# Patient Record
Sex: MALE | Race: BLACK OR AFRICAN AMERICAN | NOT HISPANIC OR LATINO | Employment: UNEMPLOYED | URBAN - METROPOLITAN AREA
[De-identification: names, ages, dates, MRNs, and addresses within clinical notes are randomized per-mention and may not be internally consistent; named-entity substitution may affect disease eponyms.]

---

## 2020-02-29 ENCOUNTER — HISTORICAL (OUTPATIENT)
Dept: LAB | Facility: HOSPITAL | Age: 25
End: 2020-02-29

## 2020-02-29 LAB
ABS NEUT (OLG): 0.79 X10(3)/MCL (ref 2.1–9.2)
ALBUMIN SERPL-MCNC: 4 GM/DL (ref 3.4–5)
ALBUMIN/GLOB SERPL: 1.2 RATIO (ref 1.1–2)
ALP SERPL-CCNC: 86 UNIT/L (ref 46–116)
ALT SERPL-CCNC: 28 UNIT/L (ref 12–78)
AST SERPL-CCNC: 22 UNIT/L (ref 15–37)
BILIRUB SERPL-MCNC: 0.3 MG/DL (ref 0.2–1)
BILIRUBIN DIRECT+TOT PNL SERPL-MCNC: 0.12 MG/DL (ref 0–0.2)
BILIRUBIN DIRECT+TOT PNL SERPL-MCNC: 0.18 MG/DL (ref 0–0.8)
BUN SERPL-MCNC: 12.1 MG/DL (ref 7–18)
CALCIUM SERPL-MCNC: 9.3 MG/DL (ref 8.5–10.1)
CHLORIDE SERPL-SCNC: 106 MMOL/L (ref 98–107)
CO2 SERPL-SCNC: 27.8 MMOL/L (ref 21–32)
CREAT SERPL-MCNC: 1.17 MG/DL (ref 0.6–1.3)
EOSINOPHIL NFR BLD MANUAL: 4 % (ref 0–8)
ERYTHROCYTE [DISTWIDTH] IN BLOOD BY AUTOMATED COUNT: 11.6 % (ref 11.5–17)
GLOBULIN SER-MCNC: 3.2 GM/DL (ref 2.4–3.5)
GLUCOSE SERPL-MCNC: 90 MG/DL (ref 74–106)
HCT VFR BLD AUTO: 42.7 % (ref 42–52)
HGB BLD-MCNC: 14.1 GM/DL (ref 14–18)
LYMPHOCYTES NFR BLD MANUAL: 70 % (ref 13–40)
MCH RBC QN AUTO: 27.5 PG (ref 27–31)
MCHC RBC AUTO-ENTMCNC: 33 GM/DL (ref 33–36)
MCV RBC AUTO: 83.2 FL (ref 80–94)
MONOCYTES NFR BLD MANUAL: 7 % (ref 2–11)
NEUTROPHILS NFR BLD MANUAL: 19 % (ref 47–80)
PLATELET # BLD AUTO: 186 X10(3)/MCL (ref 130–400)
PLATELET # BLD EST: NORMAL 10*3/UL
PMV BLD AUTO: 10.9 FL (ref 9.4–12.4)
POTASSIUM SERPL-SCNC: 4.5 MMOL/L (ref 3.5–5.1)
PROT SERPL-MCNC: 7.2 GM/DL (ref 6.4–8.2)
RBC # BLD AUTO: 5.13 X10(6)/MCL (ref 4.7–6.1)
RBC MORPH BLD: NORMAL
SODIUM SERPL-SCNC: 143 MMOL/L (ref 136–145)
T4 FREE SERPL-MCNC: 0.83 NG/DL (ref 0.76–1.46)
TSH SERPL-ACNC: 9.92 MIU/ML (ref 0.36–3.74)
WBC # SPEC AUTO: 3.4 X10(3)/MCL (ref 4.5–11.5)

## 2022-08-01 ENCOUNTER — HOSPITAL ENCOUNTER (EMERGENCY)
Facility: HOSPITAL | Age: 27
Discharge: HOME | End: 2022-08-01
Attending: EMERGENCY MEDICINE

## 2022-08-01 VITALS
HEIGHT: 66 IN | OXYGEN SATURATION: 100 % | BODY MASS INDEX: 27.14 KG/M2 | WEIGHT: 168.88 LBS | RESPIRATION RATE: 18 BRPM | DIASTOLIC BLOOD PRESSURE: 80 MMHG | TEMPERATURE: 98 F | SYSTOLIC BLOOD PRESSURE: 121 MMHG | HEART RATE: 66 BPM

## 2022-08-01 DIAGNOSIS — M25.532 LEFT WRIST PAIN: Primary | ICD-10-CM

## 2022-08-01 PROCEDURE — 99283 EMERGENCY DEPT VISIT LOW MDM: CPT | Mod: 25

## 2022-08-01 RX ORDER — IBUPROFEN 800 MG/1
800 TABLET ORAL EVERY 8 HOURS PRN
Qty: 21 TABLET | Refills: 0 | Status: SHIPPED | OUTPATIENT
Start: 2022-08-01

## 2022-08-01 NOTE — Clinical Note
"Venice Resendez (Javeyan) was seen and treated in our emergency department on 8/1/2022.  He may return to work on 08/02/2022.       If you have any questions or concerns, please don't hesitate to call.       LPN    "

## 2022-08-01 NOTE — ED PROVIDER NOTES
Encounter Date: 8/1/2022       History     Chief Complaint   Patient presents with    Wrist Pain     C/o wrist pain x2 months reports injury. Limited ROM.     Patient presents today c/o left wrist pain for 2 months. He reports fall 2 months ago while playing baseball. He has had pain since then. Pain is worse with movement. He has never been evaluated for this. Requesting referral to ortho.         Review of patient's allergies indicates:  No Known Allergies  No past medical history on file.  No past surgical history on file.  No family history on file.     Review of Systems   Constitutional: Negative for chills and fever.   Respiratory: Negative for chest tightness and shortness of breath.    Cardiovascular: Negative for chest pain, palpitations and leg swelling.   Gastrointestinal: Negative for abdominal pain, diarrhea, nausea and vomiting.   Genitourinary: Negative for dysuria and hematuria.   Musculoskeletal: Positive for arthralgias. Negative for back pain, gait problem, joint swelling, myalgias, neck pain and neck stiffness.   Skin: Negative for color change and wound.   Neurological: Negative for light-headedness and headaches.       Physical Exam     Initial Vitals   BP Pulse Resp Temp SpO2   08/01/22 1137 08/01/22 1137 08/01/22 1137 08/01/22 1246 08/01/22 1137   121/80 66 18 98.4 °F (36.9 °C) 100 %      MAP       --                Physical Exam    Vitals reviewed.  Constitutional: He appears well-developed and well-nourished. He is not diaphoretic. No distress.   HENT:   Head: Normocephalic and atraumatic.   Eyes: Conjunctivae and EOM are normal.   Neck: Neck supple.   Normal range of motion.  Cardiovascular: Normal rate, regular rhythm, normal heart sounds and intact distal pulses.   Pulmonary/Chest: Breath sounds normal. No respiratory distress.   Abdominal: Abdomen is soft. Bowel sounds are normal. He exhibits no distension. There is no abdominal tenderness. There is no rebound and no guarding.    Musculoskeletal:         General: No edema.      Cervical back: Normal range of motion and neck supple.      Comments: Left wrist non-ttp. No swelling, erythema, ecchymosis, or deformity. Full AROM of left wrist and all digits, but increased pain with movement of left wrist. NVI, 2+ radial pulses. Soft compartments.      Neurological: He is alert and oriented to person, place, and time. GCS score is 15. GCS eye subscore is 4. GCS verbal subscore is 5. GCS motor subscore is 6.   Skin: Skin is warm and dry. Capillary refill takes less than 2 seconds.   Psychiatric: He has a normal mood and affect.         ED Course   Procedures  Labs Reviewed - No data to display       Imaging Results          X-Ray Wrist Complete Left (Final result)  Result time 08/01/22 12:47:00    Final result by Joanne Hayward MD (08/01/22 12:47:00)                 Impression:      Age indeterminate distal scaphoid fracture, possibly subacute.    Otherwise no acute osseous abnormality.      Electronically signed by: Joanne Hayward  Date:    08/01/2022  Time:    12:47             Narrative:    EXAMINATION:  XR WRIST COMPLETE 3 VIEWS LEFT    CLINICAL HISTORY:  Pain in unspecified wrist    COMPARISON:  None.    FINDINGS:  Linear lucency at the distal scaphoid pole, only appreciated on oblique projection.    Otherwise no acute displaced fractures or dislocations.    Joint spaces preserved.    No blastic or lytic lesions.    Soft tissues within normal limits.                                 Medications - No data to display                       Clinical Impression:   Final diagnoses:  [M25.532] Left wrist pain (Primary)          ED Disposition Condition    Discharge Stable        ED Prescriptions     Medication Sig Dispense Start Date End Date Auth. Provider    ibuprofen (ADVIL,MOTRIN) 800 MG tablet Take 1 tablet (800 mg total) by mouth every 8 (eight) hours as needed for Pain. 21 tablet 8/1/2022  ROSEMARY Glover        Follow-up  Information     Follow up With Specialties Details Why Contact Info    [facility] - Emergency Dept Emergency Medicine  If symptoms worsen return to ED immediately [address] [facility] [address] [phone]    Primary Care Provider within 1-2 days  Go in 1 day             ROSEMARY Glover  08/01/22 4583

## 2022-08-01 NOTE — Clinical Note
Mrs. Resendez accompanied their family member to the emergency department on 8/1/2022. They may return to work on 08/02/2022.      If you have any questions or concerns, please don't hesitate to call.       LPN

## 2024-12-19 ENCOUNTER — HOSPITAL ENCOUNTER (EMERGENCY)
Facility: HOSPITAL | Age: 29
Discharge: HOME | End: 2024-12-19

## 2024-12-19 VITALS
WEIGHT: 190 LBS | HEIGHT: 69 IN | HEART RATE: 58 BPM | OXYGEN SATURATION: 98 % | SYSTOLIC BLOOD PRESSURE: 158 MMHG | RESPIRATION RATE: 14 BRPM | TEMPERATURE: 98 F | DIASTOLIC BLOOD PRESSURE: 79 MMHG | BODY MASS INDEX: 28.14 KG/M2

## 2024-12-19 DIAGNOSIS — R09.81 NASAL CONGESTION: Primary | ICD-10-CM

## 2024-12-19 LAB
FLUAV AG UPPER RESP QL IA.RAPID: NOT DETECTED
FLUBV AG UPPER RESP QL IA.RAPID: NOT DETECTED
SARS-COV-2 RNA RESP QL NAA+PROBE: NOT DETECTED

## 2024-12-19 PROCEDURE — 63600175 PHARM REV CODE 636 W HCPCS

## 2024-12-19 PROCEDURE — 96372 THER/PROPH/DIAG INJ SC/IM: CPT

## 2024-12-19 PROCEDURE — 99284 EMERGENCY DEPT VISIT MOD MDM: CPT | Mod: 25

## 2024-12-19 PROCEDURE — 25000003 PHARM REV CODE 250

## 2024-12-19 PROCEDURE — 0240U COVID/FLU A&B PCR: CPT

## 2024-12-19 RX ORDER — DEXAMETHASONE SODIUM PHOSPHATE 4 MG/ML
12 INJECTION, SOLUTION INTRA-ARTICULAR; INTRALESIONAL; INTRAMUSCULAR; INTRAVENOUS; SOFT TISSUE
Status: COMPLETED | OUTPATIENT
Start: 2024-12-19 | End: 2024-12-19

## 2024-12-19 RX ORDER — ACETAMINOPHEN 500 MG
1000 TABLET ORAL
Status: COMPLETED | OUTPATIENT
Start: 2024-12-19 | End: 2024-12-19

## 2024-12-19 RX ADMIN — ACETAMINOPHEN 1000 MG: 500 TABLET ORAL at 03:12

## 2024-12-19 RX ADMIN — DEXAMETHASONE SODIUM PHOSPHATE 12 MG: 4 INJECTION, SOLUTION INTRA-ARTICULAR; INTRALESIONAL; INTRAMUSCULAR; INTRAVENOUS; SOFT TISSUE at 03:12

## 2024-12-19 NOTE — DISCHARGE INSTRUCTIONS
You can take Tylenol 1000 mg every 6 hours or ibuprofen 600 mg every 6 hours as needed for discomfort.  Always take ibuprofen with food and stop taking if you have abdominal pain.  Did not take for more than 4-5 days in a row.  You can buy over-the-counter Zyrtec which you can take once daily for allergies and try over-the-counter nasal sprays for nasal congestion.  If you begin to have fevers or worsening symptoms please return to the emergency department for further evaluation.

## 2024-12-19 NOTE — ED PROVIDER NOTES
Encounter Date: 12/19/2024       History     Chief Complaint   Patient presents with    Nasal Congestion     C/O NASAL CONGESTION X 2 WEEKS, RT EARACHE X 3 HOURS NOT RESPONDING TO 2 ADVIL AT HOME 0100.  TAKING OTC MEDS FOR CONGESTION     HPI    Venice Resendez is a 29 y.o. male presenting for evaluation nasal condition.  Initial onset 2 weeks ago.  Patient reporting right ear pain that started 3 hours ago that did not improve after taking 2 Advil at home about 2.5 hours ago.  No fevers.  Denies sore throat, nausea or vomiting, chest pain, shortness of breath, abdominal pain or other concerns.    Review of patient's allergies indicates:  No Known Allergies  No past medical history on file.  No past surgical history on file.  No family history on file.     Review of Systems   Constitutional:         No other pertinent positives aside from those aformentioned above by patient.         Physical Exam     Initial Vitals [12/19/24 0325]   BP Pulse Resp Temp SpO2   (!) 158/79 (!) 58 14 97.5 °F (36.4 °C) 98 %      MAP       --         Physical Exam    Nursing note and vitals reviewed.  Constitutional: He appears well-developed and well-nourished. He is not diaphoretic. No distress.   HENT:   Head: Normocephalic and atraumatic.   Right Ear: External ear normal. Tympanic membrane is not perforated, not erythematous, not retracted and not bulging.   Left Ear: External ear normal. Tympanic membrane is not perforated, not erythematous, not retracted and not bulging.   Nasal congestion noted without rhinorrhea   Eyes: EOM are normal. Pupils are equal, round, and reactive to light.   Neck: Neck supple.   Normal range of motion.  Cardiovascular:  Normal rate, regular rhythm, normal heart sounds and intact distal pulses.           Pulmonary/Chest: Breath sounds normal. No respiratory distress. He has no wheezes. He has no rales.   Abdominal: Abdomen is soft. Bowel sounds are normal. He exhibits no distension. There is no abdominal  tenderness. There is no rebound and no guarding.   Musculoskeletal:         General: Normal range of motion.      Cervical back: Normal range of motion and neck supple.     Neurological: He is alert. GCS score is 15. GCS eye subscore is 4. GCS verbal subscore is 5. GCS motor subscore is 6.   Skin: Skin is warm and dry. Capillary refill takes less than 2 seconds.         ED Course   Procedures  Labs Reviewed   COVID/FLU A&B PCR - Normal       Result Value    Influenza A PCR Not Detected      Influenza B PCR Not Detected      SARS-CoV-2 PCR Not Detected      Narrative:     The Xpert Xpress SARS-CoV-2/FLU/RSV plus is a rapid, multiplexed real-time PCR test intended for the simultaneous qualitative detection and differentiation of SARS-CoV-2, Influenza A, Influenza B, and respiratory syncytial virus (RSV) viral RNA in either nasopharyngeal swab or nasal swab specimens.                Imaging Results    None          Medications   acetaminophen tablet 1,000 mg (1,000 mg Oral Given 12/19/24 0354)   dexAMETHasone injection 12 mg (12 mg Intramuscular Given 12/19/24 0354)     Medical Decision Making  See H and P and ED course.           ED Course as of 12/19/24 0433   Thu Dec 19, 2024   0341 29-year-old male with H and P as above presents today for evaluation of nasal congestion, right ear pain.  No fevers or systemic symptoms.  Took Advil without relief.  DX includes not limited to COVID, flu, URI, otitis media, etc..  Patient appears congested otherwise unremarkable exam.  Plan for COVID and flu swabs, we will give Decadron and Tylenol and reassess. [CM]   0433 Viral testing negative.  Patient with improvement of his symptoms.  Low suspicion for bacterial sinusitis given he is afebrile and otherwise well-appearing.  Patient is stable for discharge home with strict ED return precautions he verbalized understanding and agreement to plan.  Is getting his health insurance set up and will establish with PCP once able. [CM]       ED Course User Index  [CM] Yrn Seo DO                           Clinical Impression:  Final diagnoses:  [R09.81] Nasal congestion (Primary)          ED Disposition Condition    Discharge Stable          ED Prescriptions    None       Follow-up Information       Follow up With Specialties Details Why Contact Info    [facility] - Emergency Dept Emergency Medicine  If symptoms worsen [address] [facility] [address]  [phone]             Yrn Seo DO  12/19/24 0433

## 2024-12-19 NOTE — Clinical Note
"Venice Resendez (Javeyan) was seen and treated in our emergency department on 12/19/2024.  He may return to work on 12/20/2024.       If you have any questions or concerns, please don't hesitate to call.      Elvi Rowley RN    "

## 2024-12-22 ENCOUNTER — HOSPITAL ENCOUNTER (EMERGENCY)
Facility: HOSPITAL | Age: 29
Discharge: HOME | End: 2024-12-22

## 2024-12-22 VITALS
TEMPERATURE: 98 F | OXYGEN SATURATION: 97 % | RESPIRATION RATE: 18 BRPM | WEIGHT: 190 LBS | DIASTOLIC BLOOD PRESSURE: 80 MMHG | BODY MASS INDEX: 28.14 KG/M2 | SYSTOLIC BLOOD PRESSURE: 132 MMHG | HEIGHT: 69 IN | HEART RATE: 48 BPM

## 2024-12-22 DIAGNOSIS — H92.01 OTALGIA OF RIGHT EAR: Primary | ICD-10-CM

## 2024-12-22 DIAGNOSIS — H66.001 NON-RECURRENT ACUTE SUPPURATIVE OTITIS MEDIA OF RIGHT EAR WITHOUT SPONTANEOUS RUPTURE OF TYMPANIC MEMBRANE: ICD-10-CM

## 2024-12-22 PROCEDURE — 25000003 PHARM REV CODE 250

## 2024-12-22 PROCEDURE — 99284 EMERGENCY DEPT VISIT MOD MDM: CPT

## 2024-12-22 RX ORDER — AMOXICILLIN AND CLAVULANATE POTASSIUM 875; 125 MG/1; MG/1
1 TABLET, FILM COATED ORAL 2 TIMES DAILY
Qty: 14 TABLET | Refills: 0 | Status: SHIPPED | OUTPATIENT
Start: 2024-12-22

## 2024-12-22 RX ORDER — AMOXICILLIN AND CLAVULANATE POTASSIUM 875; 125 MG/1; MG/1
1 TABLET, FILM COATED ORAL
Status: COMPLETED | OUTPATIENT
Start: 2024-12-22 | End: 2024-12-22

## 2024-12-22 RX ORDER — NEOMYCIN SULFATE, POLYMYXIN B SULFATE AND HYDROCORTISONE 10; 3.5; 1 MG/ML; MG/ML; [USP'U]/ML
4 SUSPENSION/ DROPS AURICULAR (OTIC) 3 TIMES DAILY
Qty: 10 ML | Refills: 0 | Status: SHIPPED | OUTPATIENT
Start: 2024-12-22

## 2024-12-22 RX ORDER — TOBRAMYCIN AND DEXAMETHASONE 3; 1 MG/ML; MG/ML
2 SUSPENSION/ DROPS OPHTHALMIC
Status: DISCONTINUED | OUTPATIENT
Start: 2024-12-22 | End: 2024-12-22

## 2024-12-22 RX ORDER — NEOMYCIN SULFATE, POLYMYXIN B SULFATE, HYDROCORTISONE 3.5; 10000; 1 MG/ML; [USP'U]/ML; MG/ML
4 SOLUTION/ DROPS AURICULAR (OTIC)
Status: DISCONTINUED | OUTPATIENT
Start: 2024-12-22 | End: 2024-12-22

## 2024-12-22 RX ADMIN — AMOXICILLIN AND CLAVULANATE POTASSIUM 1 TABLET: 875; 125 TABLET ORAL at 05:12

## 2024-12-22 NOTE — ED PROVIDER NOTES
Encounter Date: 12/22/2024       History     Chief Complaint   Patient presents with    Otalgia     R ear pain     29-year-old male with no pertinent past medical history presenting to the ER for right ear pain.  Patient reports that a few days ago he was seen in the ED and was diagnosed with a sinus infection and given a steroid injection.  He reports that his symptoms have since improved however his right ear pain never improved.  He noted that he started having drainage from his right ear prompting him to come to the ER.  He reports some decreased hearing in that ear but denies any significant vertigo.    The history is provided by the patient.     Review of patient's allergies indicates:  No Known Allergies  No past medical history on file.  No past surgical history on file.  No family history on file.     Review of Systems   Constitutional:  Negative for chills and fever.   HENT:  Positive for ear discharge and ear pain. Negative for sore throat.    Respiratory:  Negative for cough and shortness of breath.    Cardiovascular:  Negative for chest pain.   Gastrointestinal:  Negative for nausea.   Genitourinary:  Negative for dysuria.   Musculoskeletal:  Negative for back pain.   Skin:  Negative for rash.   Neurological:  Negative for weakness.   Hematological:  Does not bruise/bleed easily.       Physical Exam     Initial Vitals [12/22/24 0414]   BP Pulse Resp Temp SpO2   132/80 (!) 48 18 97.5 °F (36.4 °C) 97 %      MAP       --         Physical Exam    Nursing note and vitals reviewed.  Constitutional: He appears well-developed and well-nourished.   HENT:   Head: Normocephalic and atraumatic.   Right Ear: There is drainage. Tympanic membrane is not perforated and not bulging. A middle ear effusion is present. No decreased hearing is noted.   Left Ear: No drainage. Tympanic membrane is not perforated and not bulging.  No middle ear effusion.   Ears:    Eyes: Pupils are equal, round, and reactive to light. Right  eye exhibits no discharge. Left eye exhibits no discharge.   Cardiovascular:  Normal rate and regular rhythm.           Pulmonary/Chest: No respiratory distress. He has no wheezes. He has no rhonchi. He has no rales.   Abdominal: Abdomen is soft. He exhibits no distension. There is no abdominal tenderness. There is no rebound and no guarding.   Musculoskeletal:         General: No edema. Normal range of motion.     Neurological: He is alert and oriented to person, place, and time. No sensory deficit. GCS score is 15. GCS eye subscore is 4. GCS verbal subscore is 5. GCS motor subscore is 6.   Skin: Skin is warm. Capillary refill takes less than 2 seconds. No rash noted.         ED Course   Procedures  Labs Reviewed - No data to display       Imaging Results    None          Medications   amoxicillin-clavulanate 875-125mg per tablet 1 tablet (1 tablet Oral Given 12/22/24 0513)     Medical Decision Making  29-year-old male with history and exam consistent with likely acute otitis media of the right ear.      Initial considerations in this patient included acute otitis media, otitis externa, mastoiditis, ear foreign body, tympanic membrane rupture, pneumonia, and other upper respiratory infections (URI) among others.     Patient presented with complaint of right ear pain and effusion on examination of the right tympanic membrane consistent with otitis media.  No abnormal lung sounds, sputum production, or other findings suggestive of pneumonia noted.  No evidence of debris or swelling of the external auditory canal suggestive of otitis externa noted on exam.  No evidence of perforation of the tympanic membrane noted on exam however patient did have active drainage on exam concerning for microperforation     We discussed discharge with antibiotics with the patient who demonstrated understanding and agreement with this plan.  Prior to discharge, we discussed return precautions, specifically for evidence of worsening  infection, and follow up with primary care doctor within 2-3 days for further evaluation, and the patient demonstrated understanding and agreement with this plan.    Risk  Prescription drug management.                                      Clinical Impression:  Final diagnoses:  [H92.01] Otalgia of right ear (Primary)  [H66.001] Non-recurrent acute suppurative otitis media of right ear without spontaneous rupture of tympanic membrane          ED Disposition Condition    Discharge Stable          ED Prescriptions       Medication Sig Dispense Start Date End Date Auth. Provider    amoxicillin-clavulanate 875-125mg (AUGMENTIN) 875-125 mg per tablet Take 1 tablet by mouth 2 (two) times daily. 14 tablet 12/22/2024 -- Monroe Chew MD    neomycin-polymyxin-hydrocortisone (CORTISPORIN) 3.5-10,000-1 mg/mL-unit/mL-% otic suspension Place 4 drops into the right ear 3 (three) times daily. 10 mL 12/22/2024 -- Monroe Chew MD          Follow-up Information       Follow up With Specialties Details Why Contact Info    [facility] - Emergency Dept Emergency Medicine  As needed [address] [facility] [address] [phone]             Monroe Chew MD  12/23/24 0503

## 2024-12-30 ENCOUNTER — HOSPITAL ENCOUNTER (EMERGENCY)
Facility: HOSPITAL | Age: 29
Discharge: HOME | End: 2024-12-30
Attending: EMERGENCY MEDICINE

## 2024-12-30 VITALS
HEIGHT: 66 IN | RESPIRATION RATE: 16 BRPM | WEIGHT: 194 LBS | DIASTOLIC BLOOD PRESSURE: 96 MMHG | OXYGEN SATURATION: 99 % | BODY MASS INDEX: 31.18 KG/M2 | SYSTOLIC BLOOD PRESSURE: 124 MMHG | HEART RATE: 57 BPM | TEMPERATURE: 99 F

## 2024-12-30 DIAGNOSIS — H65.91 OTITIS MEDIA WITH EFFUSION, RIGHT: ICD-10-CM

## 2024-12-30 DIAGNOSIS — H60.501 ACUTE OTITIS EXTERNA OF RIGHT EAR, UNSPECIFIED TYPE: Primary | ICD-10-CM

## 2024-12-30 PROCEDURE — 99283 EMERGENCY DEPT VISIT LOW MDM: CPT

## 2024-12-30 RX ORDER — CETIRIZINE HYDROCHLORIDE, PSEUDOEPHEDRINE HYDROCHLORIDE 5; 120 MG/1; MG/1
1 TABLET, FILM COATED, EXTENDED RELEASE ORAL DAILY
Qty: 10 TABLET | Refills: 0 | Status: SHIPPED | OUTPATIENT
Start: 2024-12-30 | End: 2025-01-09

## 2024-12-30 NOTE — ED PROVIDER NOTES
Encounter Date: 12/30/2024       History     Chief Complaint   Patient presents with    Otalgia     Pt reports otalgia in the right ear x 2 weeks after finishing antibiotics.      The history is provided by the patient.   Otalgia  This is a recurrent problem. The current episode started several weeks ago. There is pain in the right ear. The problem occurs intermittently. The problem has been resolved (Ear pain resolved, righ ear fullness and ringing present). The pain is at a severity of 0/10. Associated symptoms include ear discharge and rhinorrhea. Pertinent negatives include no headaches, no hearing loss, no sore throat, no abdominal pain, no diarrhea, no vomiting, no neck pain, no cough and no rash. Associated symptoms comments: URI resolved, AOM resolved. His past medical history does not include chronic ear infection, hearing loss or tympanostomy tube.     Review of patient's allergies indicates:  No Known Allergies  History reviewed. No pertinent past medical history.  History reviewed. No pertinent surgical history.  No family history on file.  Social History     Tobacco Use    Smoking status: Never    Smokeless tobacco: Never     Review of Systems   Constitutional: Negative.    HENT:  Positive for congestion, ear discharge, ear pain, postnasal drip, rhinorrhea and tinnitus. Negative for dental problem, drooling, facial swelling, hearing loss, mouth sores, nosebleeds, sinus pressure, sinus pain, sneezing, sore throat, trouble swallowing and voice change.    Eyes: Negative.    Respiratory: Negative.  Negative for cough.    Cardiovascular: Negative.    Gastrointestinal: Negative.  Negative for abdominal pain, diarrhea and vomiting.   Genitourinary: Negative.    Musculoskeletal: Negative.  Negative for neck pain.   Skin: Negative.  Negative for rash.   Neurological: Negative.  Negative for headaches.       Physical Exam     Initial Vitals [12/30/24 1346]   BP Pulse Resp Temp SpO2   124/86 60 18 98.2 °F (36.8  °C) 99 %      MAP       --         Physical Exam    Nursing note and vitals reviewed.  Constitutional: He appears well-developed and well-nourished. He is cooperative.  Non-toxic appearance. He does not have a sickly appearance. He does not appear ill. No distress.   HENT:   Head: Normocephalic and atraumatic.   Right Ear: External ear normal. There is swelling and tenderness. No drainage. No mastoid tenderness. Tympanic membrane is not erythematous and not bulging. A middle ear effusion is present. Decreased hearing is noted.   Left Ear: Hearing, tympanic membrane and external ear normal.   Nose: Nose normal. Mouth/Throat: Uvula is midline, oropharynx is clear and moist and mucous membranes are normal.   Rt TM full with white fluid, TM not bulging or erythematous. Rt canal erythema present and TTP   Eyes: Conjunctivae and EOM are normal. Pupils are equal, round, and reactive to light. No scleral icterus.   Neck: Trachea normal and phonation normal. Neck supple.   Normal range of motion.  Cardiovascular:  Normal rate, regular rhythm, normal heart sounds, intact distal pulses and normal pulses.           Pulmonary/Chest: Effort normal and breath sounds normal. No accessory muscle usage. No apnea, no tachypnea and no bradypnea. No respiratory distress. He has no decreased breath sounds. He has no wheezes. He has no rhonchi. He has no rales. He exhibits no tenderness.   Normal effort, symmetrical chest rise and fall, no accessory muscle use. Bilateral clear breath sounds in all fields anterior and posterior.      Abdominal: Abdomen is soft. Bowel sounds are normal. He exhibits no distension. There is no abdominal tenderness.   Abdomen is soft, nontender, no peritoneal signs. Tolerating PO fluids.      No right CVA tenderness.  No left CVA tenderness. There is no rebound, no guarding, no tenderness at McBurney's point and negative Toro's sign. negative psoas sign and negative Rovsing's sign  Musculoskeletal:          General: Normal range of motion.      Cervical back: Normal range of motion and neck supple.     Neurological: He is alert and oriented to person, place, and time. He has normal strength. Gait normal. GCS score is 15. GCS eye subscore is 4. GCS verbal subscore is 5. GCS motor subscore is 6.   Skin: Skin is warm and dry. Capillary refill takes less than 2 seconds. No rash noted.   Psychiatric: He has a normal mood and affect. His speech is normal and behavior is normal. Judgment and thought content normal. Cognition and memory are normal.         ED Course   Procedures  Labs Reviewed - No data to display       Imaging Results    None          Medications - No data to display  Medical Decision Making  Otitis media, Otitis Externa, Pharyngitis, cerumen impaction, ear effusion, sinusitis, among others    Right AOM, completed augmentin and ear drops. Chief complaint of right ear fullness and ringing. Ear is no longer throbbing however the canal Is tender. He did place hydrogen peroxide which he believes made it worse. He denies fever.    The patient is a 29 y.o. male who presents to the [facility] Emergency Department with a chief complaint of right ear fullness with canal irritation after putting hydrogen peroxide in his ear.  [facility] records were reviewed.  Findings stated above otitis externa and effusion noted without infection.  symptoms. The patient was discharged home with a diagnosis of Otitis externa and Otitis media with effusion. The patient was advised to not put hydrogen peroxide in his ear. It was recommended for the patient to follow up with his pcp and ENT.  The patient was provided prescriptions for cipro/hydrocortisone otic and zyrtec D.  The patient's vital signs and condition remained stable while undergoing evaluation in the Emergency Department. The patient agreed with the plan for care.   The patient is nontoxic appearing, in no acute distress, with stable vital signs and resting comfortably. There is no  objective evidence requiring urgent intervention or hospitalization. I provided counseling to the patient with regard to the medical condition, the treatment plan, specific conditions for return, and the importance of follow up. Detailed written and verbal instructions were provided to the patient and he/she expressed a verbal understanding of the discharge instructions and overall management plan. Reiterated the importance of medication administration and safety, advised the patient to follow up with primary care provider in 3-5 days or sooner if needed. All questions and concerns were addressed before leaving the Emergency Department. The patient is stable for discharge.       Risk  OTC drugs.  Prescription drug management.                                      Clinical Impression:  Final diagnoses:  [H60.501] Acute otitis externa of right ear, unspecified type (Primary)  [H65.91] Otitis media with effusion, right          ED Disposition Condition    Discharge Stable          ED Prescriptions       Medication Sig Dispense Start Date End Date Auth. Provider    cetirizine-pseudoephedrine 5-120 mg Tb12 Take 1 tablet by mouth once daily. for 10 days 10 tablet 12/30/2024 1/9/2025 Lisette Rankin FNP    ciprofloxacin-hydrocortisone 0.2-1% (CIPRO HC OTIC) otic suspension Place 3 drops into the left ear 2 (two) times daily. for 7 days 10 mL 12/30/2024 1/6/2025 Lisette Rankin FNP          Follow-up Information       Follow up With Specialties Details Why Contact Info    [facility] - Emergency Dept Emergency Medicine  If symptoms worsen [address] [facility] [address] [phone]    PCP  In 1 week  Follow up with PCP in 3-5 days.             Lisette Rankin FNP  01/04/25 6869